# Patient Record
Sex: MALE | Race: WHITE | NOT HISPANIC OR LATINO | Employment: UNEMPLOYED | URBAN - METROPOLITAN AREA
[De-identification: names, ages, dates, MRNs, and addresses within clinical notes are randomized per-mention and may not be internally consistent; named-entity substitution may affect disease eponyms.]

---
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
## 2020-03-06 ENCOUNTER — OFFICE VISIT (OUTPATIENT)
Dept: FAMILY MEDICINE CLINIC | Facility: CLINIC | Age: 20
End: 2020-03-06
Payer: COMMERCIAL

## 2020-03-06 VITALS
SYSTOLIC BLOOD PRESSURE: 116 MMHG | DIASTOLIC BLOOD PRESSURE: 70 MMHG | BODY MASS INDEX: 39.18 KG/M2 | WEIGHT: 258.5 LBS | HEIGHT: 68 IN

## 2020-03-06 DIAGNOSIS — J00 COMMON COLD: Primary | ICD-10-CM

## 2020-03-06 PROBLEM — J06.9 VIRAL UPPER RESPIRATORY TRACT INFECTION: Status: RESOLVED | Noted: 2018-03-29 | Resolved: 2020-03-06

## 2020-03-06 PROCEDURE — 99213 OFFICE O/P EST LOW 20 MIN: CPT | Performed by: FAMILY MEDICINE

## 2020-03-06 PROCEDURE — 3008F BODY MASS INDEX DOCD: CPT | Performed by: FAMILY MEDICINE

## 2020-03-06 PROCEDURE — 1036F TOBACCO NON-USER: CPT | Performed by: FAMILY MEDICINE

## 2020-03-06 RX ORDER — BROMPHENIRAMINE MALEATE, PSEUDOEPHEDRINE HYDROCHLORIDE, AND DEXTROMETHORPHAN HYDROBROMIDE 2; 30; 10 MG/5ML; MG/5ML; MG/5ML
5 SYRUP ORAL 4 TIMES DAILY PRN
Qty: 240 ML | Refills: 0 | Status: SHIPPED | OUTPATIENT
Start: 2020-03-06

## 2020-03-06 NOTE — PROGRESS NOTES
Assessment/Plan:         Diagnoses and all orders for this visit:    Common cold  Comments:   supportive care, call the office if symptoms do not   Improved  Orders:  -     brompheniramine-pseudoephedrine-DM 30-2-10 MG/5ML syrup; Take 5 mL by mouth 4 (four) times a day as needed for congestion or cough          Subjective:   Chief Complaint   Patient presents with    Cold Like Symptoms     "since yesterday"        Patient ID: Massimo Ojeda is a 23 y o  male  He is complaining of 24 hours of a scratchy throat, runny nose, mild cough  Denies fever, chills, myalgia, fatigue, dyspnea, nausea, vomiting or di      The following portions of the patient's history were reviewed and updated as appropriate: allergies, current medications, past family history, past medical history, past social history, past surgical history and problem list     Review of Systems   Constitutional: Positive for activity change and appetite change  Negative for chills, diaphoresis, fatigue and fever  HENT: Positive for congestion, postnasal drip, rhinorrhea and sore throat  Negative for ear pain, sinus pressure and sinus pain  Eyes: Negative for visual disturbance  Respiratory: Positive for cough  Negative for choking and shortness of breath  Gastrointestinal: Negative  Endocrine: Negative for cold intolerance and heat intolerance  Genitourinary: Negative  Musculoskeletal: Negative for myalgias  Skin: Negative for rash  Neurological: Negative for weakness  Objective:      /70   Ht 5' 8" (1 727 m)   Wt 117 kg (258 lb 8 oz)   BMI 39 30 kg/m²          Physical Exam   Constitutional: He is oriented to person, place, and time  He appears well-developed and well-nourished  Over weight   HENT:   Head: Normocephalic and atraumatic  Nose: Rhinorrhea ( clear) present  Mouth/Throat: Oropharynx is clear and moist    Eyes: Pupils are equal, round, and reactive to light   Conjunctivae and EOM are normal  Neck: Normal range of motion  Neck supple  No JVD present  No tracheal deviation present  No thyromegaly present  Cardiovascular: Normal rate, regular rhythm and intact distal pulses  No murmur heard  Pulmonary/Chest: Effort normal and breath sounds normal  He has no wheezes  He has no rales  Abdominal: Soft  Bowel sounds are normal  He exhibits no mass  There is no tenderness  There is no rebound and no guarding  Musculoskeletal: He exhibits no edema, tenderness or deformity  Lymphadenopathy:     He has no cervical adenopathy  Neurological: He is alert and oriented to person, place, and time  He has normal reflexes  He displays normal reflexes  No cranial nerve deficit  He exhibits normal muscle tone  Coordination normal    Skin: Skin is warm and dry  No lesion and no rash noted  Nails show no clubbing  Psychiatric: He has a normal mood and affect   Judgment normal

## 2023-05-03 ENCOUNTER — APPOINTMENT (EMERGENCY)
Dept: CT IMAGING | Facility: HOSPITAL | Age: 23
End: 2023-05-03

## 2023-05-03 ENCOUNTER — HOSPITAL ENCOUNTER (EMERGENCY)
Facility: HOSPITAL | Age: 23
Discharge: HOME | End: 2023-05-03
Attending: EMERGENCY MEDICINE

## 2023-05-03 ENCOUNTER — APPOINTMENT (EMERGENCY)
Dept: RADIOLOGY | Facility: HOSPITAL | Age: 23
End: 2023-05-03

## 2023-05-03 VITALS
BODY MASS INDEX: 39.22 KG/M2 | OXYGEN SATURATION: 94 % | WEIGHT: 257.94 LBS | HEART RATE: 88 BPM | RESPIRATION RATE: 34 BRPM | SYSTOLIC BLOOD PRESSURE: 131 MMHG | DIASTOLIC BLOOD PRESSURE: 74 MMHG | TEMPERATURE: 98.4 F

## 2023-05-03 DIAGNOSIS — J90 PLEURAL EFFUSION: ICD-10-CM

## 2023-05-03 DIAGNOSIS — J18.9 PNEUMONIA: Primary | ICD-10-CM

## 2023-05-03 DIAGNOSIS — R07.9 CHEST PAIN: ICD-10-CM

## 2023-05-03 LAB
ALBUMIN SERPL BCP-MCNC: 4.8 G/DL (ref 3.5–5)
ALP SERPL-CCNC: 61 U/L (ref 34–104)
ALT SERPL W P-5'-P-CCNC: 29 U/L (ref 7–52)
ANION GAP SERPL CALCULATED.3IONS-SCNC: 8 MMOL/L (ref 4–13)
APTT PPP: 27 SECONDS (ref 23–37)
AST SERPL W P-5'-P-CCNC: 16 U/L (ref 13–39)
ATRIAL RATE: 100 BPM
ATRIAL RATE: 94 BPM
BACTERIA UR QL AUTO: ABNORMAL /HPF
BASOPHILS # BLD AUTO: 0.07 THOUSANDS/ÂΜL (ref 0–0.1)
BASOPHILS NFR BLD AUTO: 1 % (ref 0–1)
BILIRUB SERPL-MCNC: 0.81 MG/DL (ref 0.2–1)
BILIRUB UR QL STRIP: NEGATIVE
BNP SERPL-MCNC: 17 PG/ML (ref 0–100)
BUN SERPL-MCNC: 12 MG/DL (ref 5–25)
CALCIUM SERPL-MCNC: 9.8 MG/DL (ref 8.4–10.2)
CARDIAC TROPONIN I PNL SERPL HS: <2 NG/L
CHLORIDE SERPL-SCNC: 102 MMOL/L (ref 96–108)
CLARITY UR: CLEAR
CO2 SERPL-SCNC: 28 MMOL/L (ref 21–32)
COLOR UR: YELLOW
CREAT SERPL-MCNC: 0.84 MG/DL (ref 0.6–1.3)
D DIMER PPP FEU-MCNC: 0.5 UG/ML FEU
EOSINOPHIL # BLD AUTO: 0.07 THOUSAND/ÂΜL (ref 0–0.61)
EOSINOPHIL NFR BLD AUTO: 1 % (ref 0–6)
ERYTHROCYTE [DISTWIDTH] IN BLOOD BY AUTOMATED COUNT: 12.4 % (ref 11.6–15.1)
GFR SERPL CREATININE-BSD FRML MDRD: 123 ML/MIN/1.73SQ M
GLUCOSE SERPL-MCNC: 103 MG/DL (ref 65–140)
GLUCOSE UR STRIP-MCNC: NEGATIVE MG/DL
HCT VFR BLD AUTO: 49.9 % (ref 36.5–49.3)
HGB BLD-MCNC: 16.7 G/DL (ref 12–17)
HGB UR QL STRIP.AUTO: NEGATIVE
IMM GRANULOCYTES # BLD AUTO: 0.04 THOUSAND/UL (ref 0–0.2)
IMM GRANULOCYTES NFR BLD AUTO: 0 % (ref 0–2)
INR PPP: 0.95 (ref 0.84–1.19)
KETONES UR STRIP-MCNC: NEGATIVE MG/DL
LEUKOCYTE ESTERASE UR QL STRIP: NEGATIVE
LYMPHOCYTES # BLD AUTO: 2.47 THOUSANDS/ÂΜL (ref 0.6–4.47)
LYMPHOCYTES NFR BLD AUTO: 17 % (ref 14–44)
MAGNESIUM SERPL-MCNC: 2 MG/DL (ref 1.9–2.7)
MCH RBC QN AUTO: 30 PG (ref 26.8–34.3)
MCHC RBC AUTO-ENTMCNC: 33.5 G/DL (ref 31.4–37.4)
MCV RBC AUTO: 90 FL (ref 82–98)
MONOCYTES # BLD AUTO: 1 THOUSAND/ÂΜL (ref 0.17–1.22)
MONOCYTES NFR BLD AUTO: 7 % (ref 4–12)
MUCOUS THREADS UR QL AUTO: ABNORMAL
NEUTROPHILS # BLD AUTO: 10.6 THOUSANDS/ÂΜL (ref 1.85–7.62)
NEUTS SEG NFR BLD AUTO: 74 % (ref 43–75)
NITRITE UR QL STRIP: NEGATIVE
NON-SQ EPI CELLS URNS QL MICRO: ABNORMAL /HPF
NRBC BLD AUTO-RTO: 0 /100 WBCS
P AXIS: 46 DEGREES
P AXIS: 55 DEGREES
PH UR STRIP.AUTO: 6.5 [PH] (ref 4.5–8)
PLATELET # BLD AUTO: 317 THOUSANDS/UL (ref 149–390)
PMV BLD AUTO: 9.8 FL (ref 8.9–12.7)
POTASSIUM SERPL-SCNC: 3.7 MMOL/L (ref 3.5–5.3)
PR INTERVAL: 162 MS
PR INTERVAL: 186 MS
PROT SERPL-MCNC: 8.5 G/DL (ref 6.4–8.4)
PROT UR STRIP-MCNC: ABNORMAL MG/DL
PROTHROMBIN TIME: 12.7 SECONDS (ref 11.6–14.5)
QRS AXIS: 84 DEGREES
QRS AXIS: 96 DEGREES
QRSD INTERVAL: 82 MS
QRSD INTERVAL: 86 MS
QT INTERVAL: 324 MS
QT INTERVAL: 326 MS
QTC INTERVAL: 405 MS
QTC INTERVAL: 420 MS
RBC # BLD AUTO: 5.56 MILLION/UL (ref 3.88–5.62)
RBC #/AREA URNS AUTO: ABNORMAL /HPF
SODIUM SERPL-SCNC: 138 MMOL/L (ref 135–147)
SP GR UR STRIP.AUTO: >=1.03 (ref 1–1.03)
T WAVE AXIS: 24 DEGREES
T WAVE AXIS: 43 DEGREES
UROBILINOGEN UR QL STRIP.AUTO: 0.2 E.U./DL
VENTRICULAR RATE: 100 BPM
VENTRICULAR RATE: 94 BPM
WBC # BLD AUTO: 14.25 THOUSAND/UL (ref 4.31–10.16)
WBC #/AREA URNS AUTO: ABNORMAL /HPF

## 2023-05-03 RX ORDER — DIAZEPAM 5 MG/1
5 TABLET ORAL ONCE
Status: COMPLETED | OUTPATIENT
Start: 2023-05-03 | End: 2023-05-03

## 2023-05-03 RX ORDER — MORPHINE SULFATE 4 MG/ML
4 INJECTION, SOLUTION INTRAMUSCULAR; INTRAVENOUS ONCE
Status: COMPLETED | OUTPATIENT
Start: 2023-05-03 | End: 2023-05-03

## 2023-05-03 RX ORDER — LEVOFLOXACIN 750 MG/1
750 TABLET ORAL EVERY 24 HOURS
Qty: 4 TABLET | Refills: 0 | Status: SHIPPED | OUTPATIENT
Start: 2023-05-03 | End: 2023-05-07

## 2023-05-03 RX ORDER — DEXAMETHASONE SODIUM PHOSPHATE 10 MG/ML
10 INJECTION, SOLUTION INTRAMUSCULAR; INTRAVENOUS ONCE
Status: COMPLETED | OUTPATIENT
Start: 2023-05-03 | End: 2023-05-03

## 2023-05-03 RX ORDER — METHOCARBAMOL 500 MG/1
500 TABLET, FILM COATED ORAL 2 TIMES DAILY
Qty: 6 TABLET | Refills: 0 | Status: SHIPPED | OUTPATIENT
Start: 2023-05-03 | End: 2023-05-06

## 2023-05-03 RX ADMIN — DEXAMETHASONE SODIUM PHOSPHATE 10 MG: 10 INJECTION INTRAMUSCULAR; INTRAVENOUS at 17:14

## 2023-05-03 RX ADMIN — IOHEXOL 100 ML: 350 INJECTION, SOLUTION INTRAVENOUS at 19:02

## 2023-05-03 RX ADMIN — DIAZEPAM 5 MG: 5 TABLET ORAL at 17:18

## 2023-05-03 RX ADMIN — LEVOFLOXACIN 750 MG: 500 TABLET, FILM COATED ORAL at 20:20

## 2023-05-03 RX ADMIN — MORPHINE SULFATE 4 MG: 4 INJECTION INTRAVENOUS at 17:12

## 2023-05-03 RX ADMIN — DIAZEPAM 5 MG: 5 TABLET ORAL at 19:58

## 2023-05-03 RX ADMIN — SODIUM CHLORIDE, SODIUM LACTATE, POTASSIUM CHLORIDE, AND CALCIUM CHLORIDE 1000 ML: .6; .31; .03; .02 INJECTION, SOLUTION INTRAVENOUS at 17:20

## 2023-05-03 NOTE — Clinical Note
Dimitry Kimjeniffer was seen and treated in our emergency department on 5/3/2023  Diagnosis:     Dimitry Inman    He may return on this date: 05/07/2023         If you have any questions or concerns, please don't hesitate to call        Aura Morrison DO    ______________________________           _______________          _______________  Hospital Representative                              Date                                Time

## 2023-05-03 NOTE — Clinical Note
Abdifatah Kimjeniffer was seen and treated in our emergency department on 5/3/2023  Diagnosis:     Abdifatah Posey    He may return on this date: 05/07/2023         If you have any questions or concerns, please don't hesitate to call        Sandy Aguirre DO    ______________________________           _______________          _______________  Hospital Representative                              Date                                Time

## 2023-05-03 NOTE — ED PROVIDER NOTES
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  He states that there is nothing atypical about his day  He went to work and yesterday he was delivering several cases of water as well as groceries  He felt a pinch in his back  He states he did not think anything of this what happened yesterday  Progressively worsened last night into today  He states that he cannot do deep breath or hurts  Tried taking Tylenol without any relief  He has no nausea, vomiting, palpitations or syncope  No lower extremity edema  He states that pain is deep inside in his chest as a pinching sensation  Has no palpitations  History provided by:  Patient, relative, medical records and parent   used: No    Chest Pain  Pain location:  Substernal area  Pain quality: dull, sharp, shooting and stabbing    Pain radiates to:  Mid back  Pain radiates to the back: yes    Pain severity:  Moderate  Onset quality:  Gradual  Timing:  Constant  Progression:  Worsening  Chronicity:  New  Context: breathing and at rest    Relieved by:  Nothing  Worsened by:  Deep breathing  Ineffective treatments: Tylenol    Associated symptoms: back pain and shortness of breath    Associated symptoms: no abdominal pain, no AICD problem, no altered mental status, no anorexia, no anxiety, no claudication, no cough, no diaphoresis, no dizziness, no dysphagia, no fatigue, no fever, no headache, no heartburn, no lower extremity edema, no nausea, no near-syncope, no numbness, no orthopnea, no palpitations, no PND, no syncope, not vomiting and no weakness Risk factors: male sex and obesity    Risk factors: no aortic disease, no coronary artery disease, no diabetes mellitus, no Jonathan-Danlos syndrome, no high cholesterol, no hypertension, no immobilization, no prior DVT/PE, no smoking and no surgery    Back Pain  Associated symptoms: chest pain    Associated symptoms: no abdominal pain, no dysuria, no fever, no headaches, no numbness and no weakness        Prior to Admission Medications   Prescriptions Last Dose Informant Patient Reported? Taking?   brompheniramine-pseudoephedrine-DM 30-2-10 MG/5ML syrup   No No   Sig: Take 5 mL by mouth 4 (four) times a day as needed for congestion or cough      Facility-Administered Medications: None       History reviewed  No pertinent past medical history  Past Surgical History:   Procedure Laterality Date   • NO PAST SURGERIES         History reviewed  No pertinent family history  I have reviewed and agree with the history as documented  E-Cigarette/Vaping   • E-Cigarette Use Never User      E-Cigarette/Vaping Substances   • Nicotine No    • THC No    • CBD No    • Flavoring No    • Other No    • Unknown No      Social History     Tobacco Use   • Smoking status: Passive Smoke Exposure - Never Smoker   • Smokeless tobacco: Never   Vaping Use   • Vaping Use: Never used       Review of Systems   Constitutional: Negative for chills, diaphoresis, fatigue and fever  HENT: Negative for ear pain, sore throat and trouble swallowing  Eyes: Negative for pain and visual disturbance  Respiratory: Positive for shortness of breath  Negative for cough  Cardiovascular: Positive for chest pain  Negative for palpitations, orthopnea, claudication, syncope, PND and near-syncope  Gastrointestinal: Negative for abdominal pain, anorexia, heartburn, nausea and vomiting  Genitourinary: Negative for dysuria and hematuria  Musculoskeletal: Positive for back pain  Negative for arthralgias  Skin: Negative for color change and rash  Neurological: Negative for dizziness, seizures, syncope, weakness, numbness and headaches  All other systems reviewed and are negative  Physical Exam  Physical Exam  Vitals and nursing note reviewed  Constitutional:       General: He is not in acute distress  Appearance: He is well-developed  Comments: Appears in pain   HENT:      Head: Normocephalic and atraumatic  Eyes:      Conjunctiva/sclera: Conjunctivae normal    Cardiovascular:      Rate and Rhythm: Regular rhythm  Tachycardia present  Pulses:           Radial pulses are 2+ on the right side and 2+ on the left side  Dorsalis pedis pulses are 2+ on the right side and 2+ on the left side  Heart sounds: Normal heart sounds, S1 normal and S2 normal  No murmur heard  Pulmonary:      Effort: Pulmonary effort is normal  Tachypnea present  No respiratory distress  Breath sounds: Normal breath sounds  Comments: Patient can speak in short sentences as he has pain with inspiration  Abdominal:      Palpations: Abdomen is soft  Tenderness: There is no abdominal tenderness  Musculoskeletal:         General: No swelling  Cervical back: Neck supple  Right lower leg: No edema  Left lower leg: No edema  Skin:     General: Skin is warm and dry  Capillary Refill: Capillary refill takes less than 2 seconds  Neurological:      General: No focal deficit present  Mental Status: He is alert and oriented to person, place, and time  GCS: GCS eye subscore is 4  GCS verbal subscore is 5  GCS motor subscore is 6  Cranial Nerves: Cranial nerves 2-12 are intact  Sensory: Sensation is intact  Motor: Motor function is intact  Coordination: Coordination is intact  Gait: Gait is intact  Comments: Patient ambulated throughout the ER with nonantalgic gait    Patient has functional active range of motion throughout the bilateral for extremities and lower extremities moves them spontaneously of each other   Psychiatric:         Mood and Affect: Mood normal          Behavior: Behavior normal          Vital Signs  ED Triage Vitals   Temperature Pulse Respirations Blood Pressure SpO2   05/03/23 1628 05/03/23 1628 05/03/23 1628 05/03/23 1628 05/03/23 1628   98 4 °F (36 9 °C) 93 18 168/97 98 %      Temp Source Heart Rate Source Patient Position - Orthostatic VS BP Location FiO2 (%)   05/03/23 1628 05/03/23 2022 05/03/23 1645 05/03/23 1645 --   Oral Monitor Lying Right arm       Pain Score       05/03/23 1628       7           Vitals:    05/03/23 1645 05/03/23 1815 05/03/23 2022 05/03/23 2026   BP: (!) 171/96 123/69 131/74    Pulse: 98 92 (!) 114 88   Patient Position - Orthostatic VS: Lying Lying Lying          Visual Acuity      ED Medications  Medications   dexamethasone (PF) (DECADRON) injection 10 mg (10 mg Intravenous Given 5/3/23 1714)   diazepam (VALIUM) tablet 5 mg (5 mg Oral Given 5/3/23 1718)   morphine injection 4 mg (4 mg Intravenous Given 5/3/23 1712)   lactated ringers bolus 1,000 mL (0 mL Intravenous Stopped 5/3/23 1820)   iohexol (OMNIPAQUE) 350 MG/ML injection (SINGLE-DOSE) 100 mL (100 mL Intravenous Given 5/3/23 1902)   diazepam (VALIUM) tablet 5 mg (5 mg Oral Given 5/3/23 1958)   levofloxacin (LEVAQUIN) tablet 750 mg (750 mg Oral Given 5/3/23 2020)       Diagnostic Studies  Results Reviewed     Procedure Component Value Units Date/Time    HS Troponin 0hr (reflex protocol) [[id number]]  (Normal) Collected: 05/03/23 1711    Lab Status: Final result Specimen: Blood from Arm, Left Updated: 05/03/23 1914     hs TnI 0hr <2 ng/L     Urine Microscopic [[id number]]  (Abnormal) Collected: 05/03/23 1751    Lab Status: Final result Specimen: Urine, Clean Catch Updated: 05/03/23 1826     RBC, UA 0-1 /hpf      WBC, UA 0-1 /hpf      Epithelial Cells None Seen /hpf      Bacteria, UA Occasional /hpf      MUCUS THREADS Moderate    B-Type Natriuretic Peptide(BNP) [[id number]]  (Normal) Collected: 05/03/23 1711    Lab Status: Final result Specimen: Blood from Arm, Left Updated: 05/03/23 1753     BNP 17 pg/mL     D-Dimer [[id number]]  (Abnormal) Collected: 05/03/23 1711    Lab Status: Final result Specimen: Blood from Arm, Left Updated: 05/03/23 1753     D-Dimer, Quant 0 50 ug/ml FEU     Urine Macroscopic, POC [[id number]]  (Abnormal) Collected: 05/03/23 1751    Lab Status: Final result Specimen: Urine Updated: 05/03/23 1752     Color, UA Yellow     Clarity, UA Clear     pH, UA 6 5     Leukocytes, UA Negative     Nitrite, UA Negative     Protein, UA Trace mg/dl      Glucose, UA Negative mg/dl      Ketones, UA Negative mg/dl      Urobilinogen, UA 0 2 E U /dl      Bilirubin, UA Negative     Occult Blood, UA Negative     Specific Gravity, UA >=1 030    Narrative:      CLINITEK RESULT    Protime-INR [[id number]]  (Normal) Collected: 05/03/23 1711    Lab Status: Final result Specimen: Blood from Arm, Left Updated: 05/03/23 1750     Protime 12 7 seconds      INR 0 95    APTT [[id number]]  (Normal) Collected: 05/03/23 1711    Lab Status: Final result Specimen: Blood from Arm, Left Updated: 05/03/23 1750     PTT 27 seconds     Comprehensive metabolic panel [[id number]]  (Abnormal) Collected: 05/03/23 1711    Lab Status: Final result Specimen: Blood from Arm, Left Updated: 05/03/23 1746     Sodium 138 mmol/L      Potassium 3 7 mmol/L      Chloride 102 mmol/L      CO2 28 mmol/L      ANION GAP 8 mmol/L      BUN 12 mg/dL      Creatinine 0 84 mg/dL      Glucose 103 mg/dL      Calcium 9 8 mg/dL      AST 16 U/L      ALT 29 U/L      Alkaline Phosphatase 61 U/L      Total Protein 8 5 g/dL      Albumin 4 8 g/dL      Total Bilirubin 0 81 mg/dL      eGFR 123 ml/min/1 73sq m     Narrative:      [facility] guidelines for Chronic Kidney Disease (CKD):   •  Stage 1 with normal or high GFR (GFR > 90 mL/min/1 73 square meters)  •  Stage 2 Mild CKD (GFR = 60-89 mL/min/1 73 square meters)  •  Stage 3A Moderate CKD (GFR = 45-59 mL/min/1 73 square meters)  •  Stage 3B Moderate CKD (GFR = 30-44 mL/min/1 73 square meters)  •  Stage 4 Severe CKD (GFR = 15-29 mL/min/1 73 square meters)  •  Stage 5 End Stage CKD (GFR <15 mL/min/1 73 square meters)  Note: GFR calculation is accurate only with a steady state creatinine    Magnesium [[id number]]  (Normal) Collected: 05/03/23 1711    Lab Status: Final result Specimen: Blood from Arm, Left Updated: 05/03/23 1746     Magnesium 2 0 mg/dL     CBC and differential [[id number]]  (Abnormal) Collected: 05/03/23 1711    Lab Status: Final result Specimen: Blood from Arm, Left Updated: 05/03/23 1728     WBC 14 25 Thousand/uL      RBC 5 56 Million/uL      Hemoglobin 16 7 g/dL      Hematocrit 49 9 %      MCV 90 fL      MCH 30 0 pg      MCHC 33 5 g/dL      RDW 12 4 %      MPV 9 8 fL      Platelets 424 Thousands/uL      nRBC 0 /100 WBCs      Neutrophils Relative 74 %      Immat GRANS % 0 %      Lymphocytes Relative 17 %      Monocytes Relative 7 %      Eosinophils Relative 1 %      Basophils Relative 1 %      Neutrophils Absolute 10 60 Thousands/µL      Immature Grans Absolute 0 04 Thousand/uL      Lymphocytes Absolute 2 47 Thousands/µL      Monocytes Absolute 1 00 Thousand/µL      Eosinophils Absolute 0 07 Thousand/µL      Basophils Absolute 0 07 Thousands/µL                  CTA ED chest PE Study   Final Result by Enrique Johnson MD (05/03 2012)      No pulmonary embolism to the level of the segmental vessels  Patchy consolidation in the right lower lobe with small right effusion consistent with pneumonia  Fatty liver  Workstation performed: [id number]         XR chest 1 view portable   ED Interpretation by Padmini Barros DO (05/03 1733)   Poor inspiratory effort  No acute pathology      Final Result by Enrique Johnson MD (05/03 2012)      Small right pleural effusion                    Workstation performed: [id number]                    Procedures  Procedures         ED "Course  ED Course as of 05/03/23 2158   Wed May 03, 2023   1653 Patient is a 70-year-old male here with mother and girlfriend coming in today with chest pain and back pain  On exam he appears uncomfortable  He talks in short phrases as he states deep breaths hurt him  He is tachycardic and hypertensive  EKG with sinus tach right axis deviation  Given inspiratory pain and tachycardia will add on D-dimer while starting cardiac work-up  Disclosure: Voice to text software was used in the preparation of this document and could have resulted in translational errors       Occasional wrong word or \"sound a like\" substitutions may have occurred due to the inherent limitations of voice recognition software   Read the chart carefully and recognize, using context, where substitutions have occurred        I have independently reviewed external records are available to me to the level of detail possible within the time constraints of my patient care responsibilities in the ED        1754 D-dimer 0 5  Cannot age-adjusted  Will CT   1811 Patient updated on labs and cxr  Feeling better  Will CT    1942 Patient resting in bed  Patient laughing and states that it still hurts a lot but not as intense  He is updated on pending CT  Will redose medication   2013 CT report reveals pneumonia  Trial amatory sats and give antibiotics   2020 Patient resting in bed and appears more comfortable  He states he feels markedly improved  Long discussion with him with family at bedside regarding CT results with small effusion and pneumonia  Will give first dose of antibiotics and DC home  Discussed with patient return to ER instructions    Also given some spirometer for home   2024 Patient ambulate around the ER with no oxygen on with sats 94% or greater with no dyspnea on exertion or respiratory distress             HEART Risk Score    Flowsheet Row Most Recent Value   Heart Score Risk Calculator    History 0 Filed at: 05/03/2023 " 1917   ECG 1 Filed at: 05/03/2023 1917   Age 0 Filed at: 05/03/2023 1917   Risk Factors 1 Filed at: 05/03/2023 1917   Troponin 0 Filed at: 05/03/2023 1917   HEART Score 2 Filed at: 05/03/2023 1917                PERC Rule for PE    Flowsheet Row Most Recent Value   PERC Rule for PE    Age >=50 0 Filed at: 05/03/2023 1655   HR >=100 1 Filed at: 05/03/2023 1655   O2 Sat on room air < 95% 0 Filed at: 05/03/2023 1655   History of PE or DVT 0 Filed at: 05/03/2023 1655   Recent trauma or surgery 0 Filed at: 05/03/2023 1655   Hemoptysis 0 Filed at: 05/03/2023 1655   Exogenous estrogen 0 Filed at: 05/03/2023 1655   Unilateral leg swelling 0 Filed at: 05/03/2023 1655   PERC Rule for PE Results 1 Filed at: 05/03/2023 1655              SBIRT 20yo+    Flowsheet Row Most Recent Value   Initial Alcohol Screen: US AUDIT-C     1  How often do you have a drink containing alcohol? 0 Filed at: 05/03/2023 1959   2  How many drinks containing alcohol do you have on a typical day you are drinking? 0 Filed at: 05/03/2023 1959   3a  Male UNDER 65: How often do you have five or more drinks on one occasion? 0 Filed at: 05/03/2023 1959   3b  FEMALE Any Age, or MALE 65+: How often do you have 4 or more drinks on one occassion? 0 Filed at: 05/03/2023 1959   Audit-C Score 0 Filed at: 05/03/2023 1959   KRISS: How many times in the past year have you    Used an illegal drug or used a prescription medication for non-medical reasons?  Never Filed at: 05/03/2023 Fortino Oglesby' Criteria for PE    Flowsheet Row Most Recent Value   Wells' Criteria for PE    Clinical signs and symptoms of DVT 0 Filed at: 05/03/2023 1655   PE is primary diagnosis or equally likely 0 Filed at: 05/03/2023 1655   HR >100 1 5 Filed at: 05/03/2023 1655   Immobilization at least 3 days or Surgery in the previous 4 weeks 0 Filed at: 05/03/2023 1655   Previous, objectively diagnosed PE or DVT 0 Filed at: 05/03/2023 1655   Hemoptysis 0 Filed at: 05/03/2023 8865 Malignancy with treatment within 6 months or palliative 0 Filed at: 05/03/2023 1655   Wells' Criteria Total 1 5 Filed at: 05/03/2023 1655                Medical Decision Making      EKG INTERPRETATION @ 1636  RHYTHM: Sinus tachycardia at 100 bpm  AXIS: Right axis  INTERVALS: NV interval measured at 162 ms  QRS COMPLEX: QRS measured at 86 ms  ST SEGMENT: Nonspecific ST segment changes  Diffuse artifact  QT INTERVAL: QTc measured at 420 ms  COMPARED WITH PRIOR no old to compare  Interpretation by Paulina Diallo DO    Different diagnosis : ACS, NSTEMI, pleurisry, pneumothorax, costochondritis, pneumonia, GERD, anxiety, hepatitis, pancreatitis, aortic dissection, pericarditis, myocarditis, pericardial effusion, asthma exacerbation, COPD exacerbation, herpes zoster, peritoneal rupture, esophageal spasm  EKG INTERPRETATION @ 1957  RHYTHM: Sinus tachycardia at 90 bpm  AXIS: Normal axis  INTERVALS: NV interval measured 186 ms  QRS COMPLEX: QRS measured at 82 ms  ST SEGMENT: Nonspecific ST segment changes  Diffuse artifact  QT INTERVAL: QTc measured at 405 ms  COMPARED WITH PRIOR compared to EKG prior today heart rate decreased  Interpretation by Paulina Diallo DO          Chest pain: acute illness or injury  Pleural effusion: acute illness or injury  Pneumonia: acute illness or injury  Amount and/or Complexity of Data Reviewed  Independent Historian: parent and caregiver     Details: Mother at bedside  Girlfriend at bedside  Labs: ordered  Decision-making details documented in ED Course  Details: Patient with mild leukocytosis without bands  D-dimer elevated will CT  Troponin less than 5  No acute kidney injury or electrolyte dysfunction  Radiology: ordered and independent interpretation performed  Decision-making details documented in ED Course  Details: X-ray interpreted by myself with poor inspiratory effort    CT interpreted by radiologist reveals pneumonia and pleural effusion    No PE  ECG/medicine tests: ordered and independent interpretation performed  Details: No ischemia  No arrhythmia based on my read      Risk  Prescription drug management  Disposition  Final diagnoses:   Pneumonia   Pleural effusion   Chest pain     Time reflects when diagnosis was documented in both MDM as applicable and the Disposition within this note     Time User Action Codes Description Comment    5/3/2023  8:21 PM Donnita Cola Add [J18 9] Pneumonia     5/3/2023  8:21 PM Alycia Hooks L Add [J90] Pleural effusion     5/3/2023  8:22 PM Donnita Cola Add [R07 9] Chest pain       ED Disposition     ED Disposition   Discharge    Condition   Stable    Date/Time   Wed May 3, 2023  8:21 PM    Comment   Angela Herrera discharge to home/self care  Follow-up Information     Follow up With Specialties Details Why [address] [place] [address] Medicine Schedule an appointment as soon as possible for a visit in 1 week  [id number] [address] [place] Nazario [facility] [address]  [phone]            Discharge Medication List as of 5/3/2023  8:27 PM      START taking these medications    Details   levofloxacin (LEVAQUIN) 750 mg tablet Take 1 tablet (750 mg total) by mouth every 24 hours for 4 days, Starting Wed 5/3/2023, Until Sun 5/7/2023, Normal      methocarbamol (ROBAXIN) 500 mg tablet Take 1 tablet (500 mg total) by mouth 2 (two) times a day for 3 days, Starting Wed 5/3/2023, Until Sat 5/6/2023, Normal         CONTINUE these medications which have NOT CHANGED    Details   brompheniramine-pseudoephedrine-DM 30-2-10 MG/5ML syrup Take 5 mL by mouth 4 (four) times a day as needed for congestion or cough, Starting Fri 3/6/2020, Normal             No discharge procedures on file      PDMP Review     None          ED Provider  Electronically Signed by           Padmini Barros DO  05/03/23 2157       Padmini Barros DO  05/03/23 2158

## 2023-05-03 NOTE — Clinical Note
Esvin Kimjeniffer was seen and treated in our emergency department on 5/3/2023  Diagnosis:     Esvin Ceron    He may return on this date: 05/07/2023         If you have any questions or concerns, please don't hesitate to call        Deric Saavedra DO    ______________________________           _______________          _______________  Hospital Representative                              Date                                Time

## 2023-05-03 NOTE — Clinical Note
Rani Herrera was seen and treated in our emergency department on 5/3/2023  Diagnosis:     Rani Turner    He may return on this date: 05/07/2023         If you have any questions or concerns, please don't hesitate to call        Xochitl Nguyen DO    ______________________________           _______________          _______________  Hospital Representative                              Date                                Time

## 2023-05-04 NOTE — DISCHARGE INSTRUCTIONS
You have some fluid in your lung called an effusion which can be associated with pneumonia      Please alternate Tylenol and Motrin every 6-8 hours for fever and pain control  Please complete full course of antibiotics

## 2023-05-04 NOTE — ED NOTES
Incentive spirometry taught to pt  Pt knowledged and demonstrated teach back method        Devon Oleary RN  05/03/23 2043

## 2024-03-27 ENCOUNTER — OFFICE VISIT (OUTPATIENT)
Dept: DENTISTRY | Facility: CLINIC | Age: 24
End: 2024-03-27

## 2024-03-27 VITALS — HEART RATE: 85 BPM | DIASTOLIC BLOOD PRESSURE: 85 MMHG | SYSTOLIC BLOOD PRESSURE: 142 MMHG | TEMPERATURE: 99.6 F

## 2024-03-27 DIAGNOSIS — Z01.20 ENCOUNTER FOR DENTAL EXAMINATION: Primary | ICD-10-CM

## 2024-03-27 PROCEDURE — D0230 INTRAORAL - PERIAPICAL EACH ADDITIONAL RADIOGRAPHIC IMAGE: HCPCS

## 2024-03-27 PROCEDURE — D0140 LIMITED ORAL EVALUATION - PROBLEM FOCUSED: HCPCS | Performed by: DENTIST

## 2024-03-27 PROCEDURE — D0240 INTRAORAL - OCCLUSAL RADIOGRAPHIC IMAGE: HCPCS

## 2024-03-27 PROCEDURE — D0274 BITEWINGS - 4 RADIOGRAPHIC IMAGES: HCPCS

## 2024-03-27 PROCEDURE — D0330 PANORAMIC RADIOGRAPHIC IMAGE: HCPCS

## 2024-03-27 NOTE — DENTAL PROCEDURE DETAILS
Subjective   Patient ID: Alcon Herrera is a 23 y.o. male.  Chief Complaint   Patient presents with    Emergency/limited Exam     Reviewed medical history   ASA 1    EXAM  DR GUAMAN    New patient presented for initial encounter  CC chipped  existing jaylen 8/9  He had an accident as a child and chipped the teeth  They have been restored but are now breaking again    Patient states he also has a SENSITIVE gag refex  4 BWX and 2 anterior PAX taken  ( used salt on tongue ) tolerated intra oral films well  Pano taken    He has a lot of calc.  supra and sub noted   Will need a debridement prior to restoring 8/9.    Nv  FM debridement  NV 2  jaylen 8/9  may eventually needs CRS

## 2024-09-20 ENCOUNTER — OFFICE VISIT (OUTPATIENT)
Dept: DENTISTRY | Facility: CLINIC | Age: 24
End: 2024-09-20

## 2024-09-20 VITALS — DIASTOLIC BLOOD PRESSURE: 82 MMHG | TEMPERATURE: 98 F | SYSTOLIC BLOOD PRESSURE: 130 MMHG

## 2024-09-20 DIAGNOSIS — K03.6 ACCRETIONS ON TEETH: ICD-10-CM

## 2024-09-20 DIAGNOSIS — K03.6 DENTAL CALCULUS: Primary | ICD-10-CM

## 2024-09-20 PROCEDURE — D4355 FULL MOUTH DEBRIDEMENT TO ENABLE A COMPREHENSIVE ORAL EVALUATION AND DIAGNOSIS ON A SUBSEQUENT VISIT: HCPCS

## 2024-09-20 NOTE — DENTAL PROCEDURE DETAILS
FULL MOUTH DEBRIDEMENT    Pain: 0  ASA:  1  Reviewed medical history no issues    Full mouth debridement performed to remove plaque and calculus that interferes with the ability to perform a comprehensive oral evaluation.  Generalized moderate to heavy deposits noted supra and sub gingivally   Listerine used as  post procedural rinse.  Ultra sonic  instruments used to remove heavy deposits.  He did well-  Pt has sens gag reflex but he endured the treatment!!    Reviewed home care with patient   Demonstrated brushing and flossing and interproximal cleaning techniques    Explained that patient will need to return for a post debridement re-evaluation and full mouth periodontal charting on a subsequent visit.  NOTE  HE WANTS TO HAVE # 8/9 RESTORED AS SOON AS FEASIBLE     Stressed importance on continuation of care to regain oral health.    BANDAR BOYLE OR HYG   FM PROBE  AND TX PLAN       Hopefully he will respond well to treatment and will only need a regular prophy

## 2024-11-01 ENCOUNTER — OFFICE VISIT (OUTPATIENT)
Dept: DENTISTRY | Facility: CLINIC | Age: 24
End: 2024-11-01

## 2024-11-01 VITALS — DIASTOLIC BLOOD PRESSURE: 84 MMHG | SYSTOLIC BLOOD PRESSURE: 136 MMHG | TEMPERATURE: 98.1 F | HEART RATE: 83 BPM

## 2024-11-01 DIAGNOSIS — M26.4 MALOCCLUSION: Primary | ICD-10-CM

## 2024-11-01 DIAGNOSIS — K08.50 DEFECTIVE DENTAL RESTORATION: ICD-10-CM

## 2024-11-01 DIAGNOSIS — K05.10 GINGIVITIS: ICD-10-CM

## 2024-11-01 PROCEDURE — D0150 COMPREHENSIVE ORAL EVALUATION - NEW OR ESTABLISHED PATIENT: HCPCS | Performed by: DENTIST

## 2024-11-01 NOTE — DENTAL PROCEDURE DETAILS
"Comprehensive Exam/Assessment and Re-evaluation after Debridement.     Alcon Herrera 24 y.o. male presents with self to [facility] for comprehensive exam.  PMH reviewed, no changes, ASA II. Significant medical history: see list. Significant allergies: see list. Significant medications: see list.  Pain level:  0/10  Chief complaint: \"Broken fillings of top front teeth. The fillings were done four times and is keeping come off.   Dental History: patient was seen by other provider for COMP/PAN/4BW/Selected PA, exam and treatment plan (debridement and fillings #8 and #9) on 3/27/2024. Patient was seen by [facility] on 9/20/2024 for full mouth debridement. See chart and notes.   Consent:  Reviewed procedures involved with comprehensive exam including radiographs, oral exam, and periodontal probing.   Patient understands and consent was given by self via verbal consent.  Radiographs: Current PAN/4BW/Selected PA were taken by other provider on 3/27/2024.  Oral cancer screening: normal.  Extraoral exam: no remarkable findings.  Intraoral exam: Broken existing fillings #8 and #9.  Periodontal exam: see perio charting provided today.   Hygiene - Good.  Plaque - Moderate.  Horizontal bone loss -Localized.   Vertical bone loss - None.  Subgingival calculus - Localized.  BOP - Localized.  Mobility - None.  Furcation involvements - None.  Occlusal trauma -  Anterior traumatic deep bite .  Periodontal Stage: Moderate gingivitis.  Periodontal Grade: A.  Periodontal Plan: Prophy.  Restorative exam:   1- Caries detected: Watch teeth #14,16,17,32 occlusal.   2- Teeth #8 and #9 existing MIFL broken composite fillings. History of multiple times of re-fillings of these teeth by outside dentists. Informed and demonstrated to patient the existing anterior traumatic deep bite and lack of space to provide a sufficient fillings. Informed patient any new fillings may break and fail again because of occlusion (deep bite). Recommended orthodontic " treatment. Patient approved.   3- Discussed wisdom teeth #1,16,17,32 eventually may need extractions.   Occlusal assessment:  VDO / restorative space - Collapsed.  AP Classification -  Right: Canine Class I; Molar Class I.  Left: Canine Class I; Molar Class I.  Overbite - 80%. Anterior traumatic deep bite.   Overjet -  1 mm.  Referred to ortho for consultation and treatment ASAP.   Tx plan:  1- Prophy.  2- Restorations teeth #8 and #9.  3- Referred to ortho.   Referral: Ortho for comprehensive orthodontic evaluation and treatment .  Rx: None.  Recommended recall schedule: 6 months.  Oral hygiene indicates improvement. Patient reported he started teeth brushing, flossing and use of mouthrinse regularly.   POI is given. Reviewed oral hygiene and need for recall visits.   Patient dismissed ambulatory and alert.  NV1: Prophy.  NV2: Restoration teeth #8 and #9 (60 min appointment).

## 2025-01-04 ENCOUNTER — APPOINTMENT (OUTPATIENT)
Dept: URGENT CARE | Age: 25
End: 2025-01-04